# Patient Record
Sex: FEMALE | Race: WHITE | NOT HISPANIC OR LATINO | Employment: FULL TIME | URBAN - NONMETROPOLITAN AREA
[De-identification: names, ages, dates, MRNs, and addresses within clinical notes are randomized per-mention and may not be internally consistent; named-entity substitution may affect disease eponyms.]

---

## 2024-09-12 ENCOUNTER — OFFICE VISIT (OUTPATIENT)
Dept: URGENT CARE | Facility: CLINIC | Age: 24
End: 2024-09-12
Payer: COMMERCIAL

## 2024-09-12 DIAGNOSIS — Z02.4 DRIVER'S PERMIT PHYSICAL EXAMINATION: Primary | ICD-10-CM

## 2024-09-12 NOTE — PROGRESS NOTES
[facility]        NAME: Marco A Nunez is a 24 y.o. female  : 2000    MRN: [id number]  DATE: 2024  TIME: 4:05 PM    Assessment and Plan   's permit physical examination [Z02.4]  1. 's permit physical examination          Utilized [employer] [place] speaking video  #[id number] to complete the HPI, assessment, plan of care and discharge.     [place] reviewed with patient. Patient denies having any disqualifying medical conditions. EPIC EMR was reviewed and no disqualifying conditions or medical problems were identified. Patient is medically cleared to obtain  license permit. [place] form completed by provider. Original given back to patient and a copy was placed on the patient chart.     Patient Instructions     Patient is medically cleared to obtain  license permit    Chief Complaint     Chief Complaint   Patient presents with    Annual Exam      permit         History of Present Illness       24-year-old female here for completion of physical examination to obtain  license permit.  She denies any current medical problems or any disqualifying medical conditions.  [place] reviewed.  Epic EMR reviewed.        Review of Systems   Review of Systems   Constitutional: Negative.    HENT: Negative.     Eyes: Negative.    Respiratory: Negative.     Cardiovascular: Negative.    Gastrointestinal: Negative.    Endocrine: Negative.    Genitourinary: Negative.    Musculoskeletal: Negative.    Skin: Negative.    Neurological: Negative.    Hematological: Negative.    Psychiatric/Behavioral: Negative.           Current Medications     No current outpatient medications on file.    Current Allergies     Allergies as of 2024    (No Known Allergies)            The following portions of the patient's history were reviewed and updated as appropriate: allergies, current medications, past family history, past medical history, past social history, past  surgical history and problem list.     History reviewed. No pertinent past medical history.    Past Surgical History:   Procedure Laterality Date    HERNIA REPAIR         Family History   Problem Relation Age of Onset    No Known Problems Mother     No Known Problems Father          Medications have been verified.        Objective   There were no vitals taken for this visit.       Physical Exam     Physical Exam  Vitals and nursing note reviewed.   Constitutional:       Appearance: Normal appearance. She is normal weight.   HENT:      Head: Normocephalic and atraumatic.      Right Ear: Tympanic membrane, ear canal and external ear normal.      Left Ear: Tympanic membrane, ear canal and external ear normal.      Nose: Nose normal.      Mouth/Throat:      Mouth: Mucous membranes are moist.      Pharynx: Oropharynx is clear.   Eyes:      Extraocular Movements: Extraocular movements intact.      Conjunctiva/sclera: Conjunctivae normal.      Pupils: Pupils are equal, round, and reactive to light.   Cardiovascular:      Rate and Rhythm: Normal rate and regular rhythm.      Pulses: Normal pulses.      Heart sounds: Normal heart sounds.   Pulmonary:      Effort: Pulmonary effort is normal.      Breath sounds: Normal breath sounds.   Abdominal:      General: Abdomen is flat. Bowel sounds are normal.      Palpations: Abdomen is soft.   Musculoskeletal:         General: Normal range of motion.      Cervical back: Normal range of motion and neck supple.   Skin:     General: Skin is warm and dry.      Capillary Refill: Capillary refill takes less than 2 seconds.   Neurological:      General: No focal deficit present.      Mental Status: She is alert and oriented to person, place, and time.   Psychiatric:         Mood and Affect: Mood normal.         Behavior: Behavior normal.         Thought Content: Thought content normal.         Judgment: Judgment normal.

## 2025-02-28 ENCOUNTER — OFFICE VISIT (OUTPATIENT)
Dept: URGENT CARE | Facility: CLINIC | Age: 25
End: 2025-02-28

## 2025-02-28 VITALS
WEIGHT: 164 LBS | HEART RATE: 96 BPM | DIASTOLIC BLOOD PRESSURE: 71 MMHG | OXYGEN SATURATION: 98 % | SYSTOLIC BLOOD PRESSURE: 122 MMHG | RESPIRATION RATE: 16 BRPM | TEMPERATURE: 97.6 F

## 2025-02-28 DIAGNOSIS — J02.9 ACUTE PHARYNGITIS, UNSPECIFIED ETIOLOGY: ICD-10-CM

## 2025-02-28 DIAGNOSIS — R68.89 FLU-LIKE SYMPTOMS: Primary | ICD-10-CM

## 2025-02-28 LAB — S PYO AG THROAT QL: NEGATIVE

## 2025-02-28 PROCEDURE — 87880 STREP A ASSAY W/OPTIC: CPT

## 2025-02-28 PROCEDURE — 99213 OFFICE O/P EST LOW 20 MIN: CPT

## 2025-02-28 PROCEDURE — 87070 CULTURE OTHR SPECIMN AEROBIC: CPT

## 2025-02-28 NOTE — PROGRESS NOTES
[facility]        NAME: Robert Harrington is a 24 y.o. female  : 2000    MRN: [id number]  DATE: 2025  TIME: 7:46 PM    Assessment and Plan   Flu-like symptoms [R68.89]  1. Flu-like symptoms        2. Acute pharyngitis, unspecified etiology  POCT rapid ANTIGEN strepA    Throat culture    Throat culture        Rapid Strep: Negative    Formal throat culture in process.  Symptoms are likely viral in nature, suspicious for influenza.  Warm salt water gargles and Chloraseptic spray for throat pain. Tylenol/ibuprofen for pain/fever. Increased fluids & rest. May continue with other OTC and supportive therapies at home. Patient in agreement with plan & verbalized understanding.       Patient Instructions   Rapid Strep: Negative    Tylenol/ibuprofen for pain/fever.  Increased fluids & rest.   Warm salt water rinses.  Chloraseptic sprays for sore throat.     Viral symptoms may last for a total of 1-3 weeks. Symptoms typically start with a sore throat and progress to include sinus pain/pressure, runny nose (yellow/green), and congestion/cough. The yellow/green color does not necessarily indicate a bacterial sinus infection. If your sinus symptoms worsen on day 10-14, you may want to consider re-evaluation for a possible secondary bacterial sinus infection. Coughs are typically most bothersome the first 1-2 weeks. Coughs frequently linger for 4-6 weeks. However, have your lungs re-evaluated if you develop sudden worsening cough, shortness or breath or chest discomfort.     Vitamin D3 2000 IU daily  Vitamin C 1000mg twice per day  Some studies suggest that Zinc 12.5-15mg every 2 hours while awake x 5 days may shorten the duration cold symptoms by 1-2 days.   Fluids and rest  Nasal saline spray (Extra Strength) 3 drops in each nostril 4x per day may shorten the duration of illness by 1-2 days and help prevent spread.  Afrin if severe congestion (do not use for more than 3 days)  Over the counter  cold medication as needed (EX: Mucinex DM, Sudafed I.e. pseudoephedrine, Tylenol, Flonase)  Sinus Rinses (use distilled water only and carefully follow instructions)  Salt water gargles and chloraseptic spray  Throat Coat Tea (herbal licorice root tea for sore throat-add honey unless diabetic)  Warm compresses over sinuses  Steam treatment (utilize proper safety precautions when in contact with hot water/steam)    Follow up with PCP in 3-5 days.  Proceed to  ER if symptoms worsen.    If tests have been performed at [facility], our office will contact you with results if changes need to be made to the care plan discussed with you at the visit.  [facility].    Chief Complaint     Chief Complaint   Patient presents with    Generalized Body Aches     Started with body aches and feels like she has a fever and chills about 2 days ago         History of Present Illness       Patient is a 24-year-old female who presents today for evaluation of flulike symptoms and sore throat.  She reports that her symptoms have been ongoing x 2 days.  She denies being exposed any known sick contacts.  She has tried taking Advil for her symptoms, which provided mild relief.   [id number] was used to obtain HPI and complete physical examination.       Generalized Body Aches  The current episode started in the past 7 days (x2 days). The problem occurs constantly. The problem is unchanged. The pain is moderate. Nothing aggravates the symptoms. Associated symptoms include congestion, headaches, rhinorrhea, a sore throat, a URI, fatigue and a fever (Did not check temperature.). Pertinent negatives include no ear pain, chest pain, coughing, shortness of breath, abdominal pain, diarrhea, nausea, vomiting or rash. Treatments tried: Advil.       Review of Systems   Review of Systems   Constitutional:  Positive for chills, fatigue and fever (Did not check temperature.). Negative for activity  change, appetite change and diaphoresis.   HENT:  Positive for congestion, rhinorrhea and sore throat. Negative for ear pain, sinus pressure, sinus pain, trouble swallowing and voice change.    Respiratory:  Negative for cough, chest tightness and shortness of breath.    Cardiovascular:  Negative for chest pain and palpitations.   Gastrointestinal:  Negative for abdominal pain, diarrhea, nausea and vomiting.   Genitourinary:  Negative for decreased urine volume and difficulty urinating.   Musculoskeletal:  Positive for myalgias. Negative for gait problem.   Skin:  Negative for color change, pallor and rash.   Neurological:  Positive for headaches. Negative for dizziness, weakness and light-headedness.   Hematological:  Negative for adenopathy.         Current Medications     No current outpatient medications on file.    Current Allergies     Allergies as of 02/28/2025    (No Known Allergies)            The following portions of the patient's history were reviewed and updated as appropriate: allergies, current medications, past family history, past medical history, past social history, past surgical history and problem list.     History reviewed. No pertinent past medical history.    Past Surgical History:   Procedure Laterality Date    HERNIA REPAIR         Family History   Problem Relation Age of Onset    No Known Problems Mother     No Known Problems Father          Medications have been verified.        Objective   /71   Pulse 96   Temp 97.6 °F (36.4 °C)   Resp 16   Wt 74.4 kg (164 lb)   SpO2 98%   No LMP recorded.       Physical Exam     Physical Exam  Vitals and nursing note reviewed.   Constitutional:       General: She is not in acute distress.     Appearance: Normal appearance. She is ill-appearing. She is not toxic-appearing.   HENT:      Head: Normocephalic and atraumatic.      Right Ear: Tympanic membrane, ear canal and external ear normal.      Left Ear: Tympanic membrane, ear canal and  external ear normal.      Nose: Congestion present. No rhinorrhea.      Right Sinus: No maxillary sinus tenderness or frontal sinus tenderness.      Left Sinus: No maxillary sinus tenderness or frontal sinus tenderness.      Mouth/Throat:      Lips: Pink. No lesions.      Mouth: Mucous membranes are moist.      Pharynx: Oropharynx is clear. Uvula midline. Posterior oropharyngeal erythema present. No pharyngeal swelling, oropharyngeal exudate or uvula swelling.      Tonsils: No tonsillar exudate. 2+ on the right. 2+ on the left.   Eyes:      Extraocular Movements: Extraocular movements intact.      Conjunctiva/sclera: Conjunctivae normal.      Pupils: Pupils are equal, round, and reactive to light.   Cardiovascular:      Rate and Rhythm: Normal rate and regular rhythm.      Pulses: Normal pulses.      Heart sounds: Normal heart sounds.   Pulmonary:      Effort: Pulmonary effort is normal. No respiratory distress.      Breath sounds: Normal breath sounds. No stridor. No wheezing, rhonchi or rales.   Chest:      Chest wall: No tenderness.   Musculoskeletal:         General: Normal range of motion.      Cervical back: Normal range of motion and neck supple. No tenderness.   Lymphadenopathy:      Cervical: No cervical adenopathy.   Skin:     General: Skin is warm and dry.      Capillary Refill: Capillary refill takes less than 2 seconds.      Coloration: Skin is not pale.      Findings: No erythema or rash.   Neurological:      General: No focal deficit present.      Mental Status: She is alert. Mental status is at baseline.   Psychiatric:         Mood and Affect: Mood normal.         Behavior: Behavior normal.         Thought Content: Thought content normal.         Judgment: Judgment normal.

## 2025-02-28 NOTE — LETTER
February 28, 2025     Patient: Robert Harrington   YOB: 2000   Date of Visit: 2/28/2025       To Whom it May Concern:    Robert Harrington was seen in my clinic on 2/28/2025. She may return to work on 3/3/25 .     Sincerely,      WOO Gregory

## 2025-02-28 NOTE — PATIENT INSTRUCTIONS
Rapid Strep: Negative    Tylenol/ibuprofen for pain/fever.  Increased fluids & rest.   Warm salt water rinses.  Chloraseptic sprays for sore throat.     Viral symptoms may last for a total of 1-3 weeks. Symptoms typically start with a sore throat and progress to include sinus pain/pressure, runny nose (yellow/green), and congestion/cough. The yellow/green color does not necessarily indicate a bacterial sinus infection. If your sinus symptoms worsen on day 10-14, you may want to consider re-evaluation for a possible secondary bacterial sinus infection. Coughs are typically most bothersome the first 1-2 weeks. Coughs frequently linger for 4-6 weeks. However, have your lungs re-evaluated if you develop sudden worsening cough, shortness or breath or chest discomfort.     Vitamin D3 2000 IU daily  Vitamin C 1000mg twice per day  Some studies suggest that Zinc 12.5-15mg every 2 hours while awake x 5 days may shorten the duration cold symptoms by 1-2 days.   Fluids and rest  Nasal saline spray (Extra Strength) 3 drops in each nostril 4x per day may shorten the duration of illness by 1-2 days and help prevent spread.  Afrin if severe congestion (do not use for more than 3 days)  Over the counter cold medication as needed (EX: Mucinex DM, Sudafed I.e. pseudoephedrine, Tylenol, Flonase)  Sinus Rinses (use distilled water only and carefully follow instructions)  Salt water gargles and chloraseptic spray  Throat Coat Tea (herbal licorice root tea for sore throat-add honey unless diabetic)  Warm compresses over sinuses  Steam treatment (utilize proper safety precautions when in contact with hot water/steam)    Follow up with PCP in 3-5 days.  Proceed to  ER if symptoms worsen.    If tests have been performed at [facility], our office will contact you with results if changes need to be made to the care plan discussed with you at the visit.  You can review your full results on [facility] MyChart.

## 2025-03-02 LAB — BACTERIA THROAT CULT: NORMAL

## 2025-07-12 ENCOUNTER — OFFICE VISIT (OUTPATIENT)
Dept: URGENT CARE | Facility: CLINIC | Age: 25
End: 2025-07-12
Payer: COMMERCIAL

## 2025-07-12 VITALS
DIASTOLIC BLOOD PRESSURE: 83 MMHG | TEMPERATURE: 98 F | BODY MASS INDEX: 25.71 KG/M2 | RESPIRATION RATE: 18 BRPM | HEART RATE: 92 BPM | HEIGHT: 66 IN | WEIGHT: 160 LBS | OXYGEN SATURATION: 99 % | SYSTOLIC BLOOD PRESSURE: 118 MMHG

## 2025-07-12 DIAGNOSIS — N94.6 DYSMENORRHEA: ICD-10-CM

## 2025-07-12 DIAGNOSIS — G43.919 INTRACTABLE MIGRAINE WITHOUT STATUS MIGRAINOSUS, UNSPECIFIED MIGRAINE TYPE: Primary | ICD-10-CM

## 2025-07-12 PROCEDURE — 96372 THER/PROPH/DIAG INJ SC/IM: CPT | Performed by: PHYSICIAN ASSISTANT

## 2025-07-12 PROCEDURE — G0381 LEV 2 HOSP TYPE B ED VISIT: HCPCS | Performed by: PHYSICIAN ASSISTANT

## 2025-07-12 PROCEDURE — 99282 EMERGENCY DEPT VISIT SF MDM: CPT | Performed by: PHYSICIAN ASSISTANT

## 2025-07-12 RX ORDER — FROVATRIPTAN SUCCINATE 2.5 MG/1
2.5 TABLET, FILM COATED ORAL 2 TIMES DAILY
Qty: 6 TABLET | Refills: 0 | Status: CANCELLED | OUTPATIENT
Start: 2025-07-12 | End: 2025-07-15

## 2025-07-12 RX ORDER — IBUPROFEN 800 MG/1
800 TABLET, FILM COATED ORAL 4 TIMES DAILY PRN
Qty: 12 TABLET | Refills: 0 | Status: CANCELLED | OUTPATIENT
Start: 2025-07-12 | End: 2025-07-15

## 2025-07-12 RX ORDER — KETOROLAC TROMETHAMINE 30 MG/ML
30 INJECTION, SOLUTION INTRAMUSCULAR; INTRAVENOUS ONCE
Status: COMPLETED | OUTPATIENT
Start: 2025-07-12 | End: 2025-07-12

## 2025-07-12 RX ORDER — IBUPROFEN 800 MG/1
800 TABLET, FILM COATED ORAL EVERY 6 HOURS PRN
Qty: 20 TABLET | Refills: 0 | Status: SHIPPED | OUTPATIENT
Start: 2025-07-12 | End: 2025-07-17

## 2025-07-12 RX ADMIN — KETOROLAC TROMETHAMINE 30 MG: 30 INJECTION, SOLUTION INTRAMUSCULAR; INTRAVENOUS at 15:02

## 2025-07-12 NOTE — LETTER
July 12, 2025     Patient: Robert Harrington   YOB: 2000   Date of Visit: 7/12/2025       To Whom it May Concern:    Robert Harrington was seen in my clinic on 7/12/2025.   If you have any questions or concerns, please don't hesitate to call.         Sincerely,          Nora Leigh PA-C        CC: No Recipients

## 2025-07-12 NOTE — PROGRESS NOTES
[facility]  Name: Robert Harrington      : 2000      MRN: [id number]  Encounter Provider: Nora Leigh PA-C  Encounter Date: 2025   Encounter department: [facility] [place]  :  Assessment & Plan  Intractable migraine without status migrainosus, unspecified migraine type    Orders:    ibuprofen (MOTRIN) 800 mg tablet; Take 1 tablet (800 mg total) by mouth every 6 (six) hours as needed for mild pain for up to 5 days    ketorolac (TORADOL) injection 30 mg    Dysmenorrhea    Orders:    ibuprofen (MOTRIN) 800 mg tablet; Take 1 tablet (800 mg total) by mouth every 6 (six) hours as needed for mild pain for up to 5 days    ketorolac (TORADOL) injection 30 mg        Patient Instructions  Follow up with PCP in 3-5 days.  Proceed to  ER if symptoms worsen.    If tests are performed, our office will contact you with results only if changes need to made to the care plan discussed with you at the visit. [facility]    Chief Complaint:   Chief Complaint   Patient presents with    Dysmenorrhea     Very strong cramping, yetserday and today    Headache     Started today  Otc meds did not help     History of Present Illness   Migraine  This is a new problem. The current episode started today. The problem has been rapidly worsening since onset. The pain is present in the bilateral. The pain does not radiate. The pain quality is not similar to prior headaches. The quality of the pain is described as pulsating. The pain is moderate. Associated symptoms include abdominal pain. The symptoms are aggravated by menstrual cycle. Past treatments include acetaminophen and NSAIDs. The treatment provided no relief.   Abdominal Pain  This is a new problem. The current episode started in the past 7 days. The onset quality is sudden. The problem occurs intermittently. The problem has been rapidly worsening since onset. The pain is located in the suprapubic  "region. The pain is moderate. The quality of the pain is described as cramping. Associated symptoms include headaches. Past treatments include acetaminophen.     History obtained from: patient    Review of Systems   Gastrointestinal:  Positive for abdominal pain.   Neurological:  Positive for headaches.     Past Medical History   Past Medical History[1]  Past Surgical History[2]  Family History[3]  she reports that she has never smoked. She has never used smokeless tobacco. She reports current alcohol use. She reports that she does not use drugs.  No current outpatient medicationsAllergies[4]     Objective   /83   Pulse 92   Temp 98 °F (36.7 °C)   Resp 18   Ht 5' 6\" (1.676 m)   Wt 72.6 kg (160 lb)   LMP 07/11/2025 (Exact Date)   SpO2 99%   BMI 25.82 kg/m²      Physical Exam  Constitutional:       General: She is not in acute distress.     Appearance: She is well-developed.   HENT:      Head: Normocephalic and atraumatic.      Right Ear: Tympanic membrane, ear canal and external ear normal.      Left Ear: Tympanic membrane, ear canal and external ear normal.      Mouth/Throat:      Mouth: Mucous membranes are moist.      Pharynx: No oropharyngeal exudate or posterior oropharyngeal erythema.     Eyes:      Extraocular Movements: Extraocular movements intact.      Conjunctiva/sclera: Conjunctivae normal.      Pupils: Pupils are equal, round, and reactive to light.     Neck:      Comments: Negative Kernig's and Brudzinski.  Cardiovascular:      Rate and Rhythm: Normal rate and regular rhythm.      Heart sounds: Normal heart sounds. No murmur heard.     No friction rub. No gallop.   Pulmonary:      Effort: Pulmonary effort is normal. No respiratory distress.      Breath sounds: Normal breath sounds. No wheezing, rhonchi or rales.     Musculoskeletal:         General: Normal range of motion.      Cervical back: Normal range of motion.   Lymphadenopathy:      Cervical: No cervical adenopathy.     Skin:     " "General: Skin is warm and dry.     Neurological:      Mental Status: She is alert and oriented to person, place, and time.      Cranial Nerves: No cranial nerve deficit.      Sensory: No sensory deficit.      Motor: No weakness or pronator drift.      Coordination: Coordination normal. Finger-Nose-Finger Test normal. Rapid alternating movements normal.      Gait: Gait normal.     Psychiatric:         Behavior: Behavior normal.         Thought Content: Thought content normal.         Judgment: Judgment normal.         Administrative Statements   I have spent a total time of 10 minutes in caring for this patient on the day of the visit/encounter including Prognosis, Risks and benefits of tx options, Instructions for management, Documenting in the medical record, and Obtaining or reviewing history  .Portions of the record may have been created with voice recognition software.  Occasional wrong word or \"sound a like\" substitutions may have occurred due to the inherent limitations of voice recognition software.  Read the chart carefully and recognize, using context, where substitutions have occurred.         [1] No past medical history on file.  [2]   Past Surgical History:  Procedure Laterality Date    HERNIA REPAIR     [3]   Family History  Problem Relation Name Age of Onset    No Known Problems Mother      No Known Problems Father     [4] No Known Allergies    "

## 2025-08-15 ENCOUNTER — OFFICE VISIT (OUTPATIENT)
Dept: URGENT CARE | Facility: CLINIC | Age: 25
End: 2025-08-15
Payer: COMMERCIAL